# Patient Record
Sex: FEMALE | Race: WHITE | NOT HISPANIC OR LATINO | Employment: FULL TIME | URBAN - METROPOLITAN AREA
[De-identification: names, ages, dates, MRNs, and addresses within clinical notes are randomized per-mention and may not be internally consistent; named-entity substitution may affect disease eponyms.]

---

## 2024-10-11 ENCOUNTER — LAB (OUTPATIENT)
Dept: OBSTETRICS AND GYNECOLOGY | Facility: CLINIC | Age: 39
End: 2024-10-11
Payer: COMMERCIAL

## 2024-10-11 DIAGNOSIS — Z32.00 POSSIBLE PREGNANCY, NOT CONFIRMED: Primary | ICD-10-CM

## 2024-10-12 LAB — HCG INTACT+B SERPL-ACNC: 674 MIU/ML

## 2024-10-15 ENCOUNTER — LAB (OUTPATIENT)
Dept: OBSTETRICS AND GYNECOLOGY | Facility: CLINIC | Age: 39
End: 2024-10-15
Payer: COMMERCIAL

## 2024-10-15 DIAGNOSIS — Z32.00 POSSIBLE PREGNANCY, NOT CONFIRMED: Primary | ICD-10-CM

## 2024-10-16 LAB — HCG INTACT+B SERPL-ACNC: NORMAL MIU/ML

## 2024-10-18 ENCOUNTER — TELEPHONE (OUTPATIENT)
Dept: OBSTETRICS AND GYNECOLOGY | Facility: CLINIC | Age: 39
End: 2024-10-18
Payer: COMMERCIAL

## 2024-10-18 ENCOUNTER — LAB (OUTPATIENT)
Dept: OBSTETRICS AND GYNECOLOGY | Facility: CLINIC | Age: 39
End: 2024-10-18
Payer: COMMERCIAL

## 2024-10-18 DIAGNOSIS — O09.299 HISTORY OF MISCARRIAGE, CURRENTLY PREGNANT: ICD-10-CM

## 2024-10-18 DIAGNOSIS — O26.859 SPOTTING IN EARLY PREGNANCY: ICD-10-CM

## 2024-10-18 DIAGNOSIS — O26.859 SPOTTING IN EARLY PREGNANCY: Primary | ICD-10-CM

## 2024-10-18 NOTE — TELEPHONE ENCOUNTER
Caller: Ortiz Wilkins     Relationship: SELF    Best call back number: [phone] (home)       What is your medical concern? PT STARTED SPOTTING AT 3 PM YESTERDAY. IT STOPPED  PM. SHE STATES SHE WOKE UP THIS MORNING AND SHE NOTICED MORE WHEN SHE WIPED. IT IS PINK IN COLOR.    How long has this issue been going on? 3 PM YESTERDAY    Is your provider already aware of this issue? NO    Have you been treated for this issue? NO

## 2024-10-18 NOTE — TELEPHONE ENCOUNTER
Pt has NOB scheduled 10/24/2024.  KS  H/o Miscarriage     She reports she had some some light pink spotting yesterday afternoon but it resolved before bed. She states the spotting has returned and it is still pink and only when she wipes. She denies any pain. She reports IC on Wednesday 10/16. I let her know this is likely the cause for the spotting and since it is light pink and she is not having any pain she should monitor and call back if bleeding becomes like a period or if she has pain. She asked if she could have another HCG completed since her last on 10/15 did not fully double. I placed the order. She VU

## 2024-10-19 LAB — HCG INTACT+B SERPL-ACNC: 192 MIU/ML

## 2024-10-24 ENCOUNTER — OFFICE VISIT (OUTPATIENT)
Dept: OBSTETRICS AND GYNECOLOGY | Facility: CLINIC | Age: 39
End: 2024-10-24
Payer: COMMERCIAL

## 2024-10-24 VITALS
BODY MASS INDEX: 19.44 KG/M2 | DIASTOLIC BLOOD PRESSURE: 82 MMHG | HEIGHT: 60 IN | SYSTOLIC BLOOD PRESSURE: 112 MMHG | WEIGHT: 99 LBS

## 2024-10-24 DIAGNOSIS — O03.9 MISCARRIAGE: Primary | ICD-10-CM

## 2024-10-24 PROBLEM — Z00.00 ENCOUNTER FOR ANNUAL PHYSICAL EXAM: Status: RESOLVED | Noted: 2023-11-20 | Resolved: 2024-10-24

## 2024-10-24 LAB — HCG INTACT+B SERPL-ACNC: 18.1 MIU/ML

## 2024-10-24 NOTE — PROGRESS NOTES
Chief Complaint   Patient presents with   • Follow-up     TAB           HPI  Ortiz Wilkins is a 39 y.o. female, , who presents for ultrasound without gestational sac. Patient states that she had a +UPT on 10/8/2024. Her LMP was 9/10/2024. She had a MAB last year and had a suction d&c. She does have a living child that is ten years old. Patient reports that she had been taking fertility medication previously, but this pregnancy was natural. Patient reports vaginal spotting last Friday, denies any vaginal bleeding since.     She had HCG on 10/11/2024 that was 674, 10/15/2024 1003, and 192 on 10/18/2024.     Recent Tests:  She had a urine pregnancy test that was done 3 weeks ago that was positive..    US today: Yes.  Findings showed no evidence of pregnancy.  I have personally evaluated the U/S and agree with the findings. Annmarie Aguilar MD  She has not had prenatal care.  She denies associated abdominal or pelvic pain.. Her past medical history is notable for missed  .  She does not have passage of tissue.  Rh Status: Positive  She reports no additional symptoms or complaints.    The additional following portions of the patient's history were reviewed and updated as appropriate: allergies, current medications, past family history, past medical history, past social history, past surgical history, and problem list.    Review of Systems   Constitutional: Negative.    HENT: Negative.     Eyes: Negative.    Respiratory: Negative.     Cardiovascular: Negative.    Gastrointestinal: Negative.    Endocrine: Negative.    Genitourinary: Negative.    Musculoskeletal: Negative.    Skin: Negative.    Allergic/Immunologic: Negative.    Neurological: Negative.    Hematological: Negative.    Psychiatric/Behavioral: Negative.       All other systems reviewed and are negative.     I have reviewed and agree with the HPI, ROS, and historical information as entered above. Annmarie Aguilar MD      Objective   /82   " Ht 152.4 cm (60\")   Wt 44.9 kg (99 lb)   LMP 09/10/2024   BMI 19.33 kg/m²     Physical Exam  Vitals and nursing note reviewed.   Constitutional:       Appearance: Normal appearance.   HENT:      Head: Normocephalic and atraumatic.   Eyes:      General: No scleral icterus.     Pupils: Pupils are equal, round, and reactive to light.   Pulmonary:      Effort: Pulmonary effort is normal.      Breath sounds: Normal breath sounds.   Abdominal:      General: Bowel sounds are normal. There is no distension.      Palpations: Abdomen is soft.      Tenderness: There is no abdominal tenderness.   Musculoskeletal:         General: Normal range of motion.      Cervical back: Normal range of motion and neck supple.      Right lower leg: No edema.      Left lower leg: No edema.   Skin:     General: Skin is warm and dry.   Neurological:      General: No focal deficit present.      Mental Status: She is alert and oriented to person, place, and time.   Psychiatric:         Mood and Affect: Mood normal.         Behavior: Behavior normal. Behavior is cooperative.          Assessment and Plan    Problem List Items Addressed This Visit    None  Visit Diagnoses       Miscarriage    -  Primary    Relevant Orders    HCG, B-subunit, Quantitative            Falling hcg and no evidence of pregnancy on US today. Appears to be completed Sab.   Repeat hcg today and follow to zero.  No follow-ups on file.    I spent 30 minutes caring for Ortiz on this date of service. This time includes time spent by me in the following activities:preparing for the visit, reviewing tests, obtaining and/or reviewing a separately obtained history, counseling and educating the patient/family/caregiver, and documenting information in the medical record        Annmarie Aguilar MD  10/24/2024    "